# Patient Record
Sex: MALE | Race: WHITE | NOT HISPANIC OR LATINO | Employment: FULL TIME | URBAN - NONMETROPOLITAN AREA
[De-identification: names, ages, dates, MRNs, and addresses within clinical notes are randomized per-mention and may not be internally consistent; named-entity substitution may affect disease eponyms.]

---

## 2024-10-02 ENCOUNTER — OFFICE VISIT (OUTPATIENT)
Dept: FAMILY MEDICINE CLINIC | Facility: CLINIC | Age: 29
End: 2024-10-02

## 2024-10-02 VITALS
BODY MASS INDEX: 41.75 KG/M2 | WEIGHT: 291.6 LBS | DIASTOLIC BLOOD PRESSURE: 80 MMHG | HEIGHT: 70 IN | SYSTOLIC BLOOD PRESSURE: 128 MMHG | TEMPERATURE: 98 F | OXYGEN SATURATION: 97 % | HEART RATE: 71 BPM

## 2024-10-02 DIAGNOSIS — J01.11 ACUTE RECURRENT FRONTAL SINUSITIS: ICD-10-CM

## 2024-10-02 DIAGNOSIS — H65.191 OTHER ACUTE NONSUPPURATIVE OTITIS MEDIA OF RIGHT EAR, RECURRENCE NOT SPECIFIED: Primary | ICD-10-CM

## 2024-10-02 RX ORDER — CEFDINIR 300 MG/1
300 CAPSULE ORAL 2 TIMES DAILY
Qty: 20 CAPSULE | Refills: 0 | Status: SHIPPED | OUTPATIENT
Start: 2024-10-02 | End: 2024-10-12

## 2024-10-02 RX ORDER — LORATADINE/PSEUDOEPHEDRINE 5 MG-120MG
TABLET, EXTENDED RELEASE 12 HR ORAL
Qty: 20 TABLET | Refills: 0 | Status: SHIPPED | OUTPATIENT
Start: 2024-10-02

## 2024-10-02 RX ORDER — DEXTROMETHORPHAN HYDROBROMIDE AND PROMETHAZINE HYDROCHLORIDE 15; 6.25 MG/5ML; MG/5ML
SYRUP ORAL
Qty: 118 ML | Refills: 0 | Status: SHIPPED | OUTPATIENT
Start: 2024-10-02

## 2024-10-02 RX ORDER — CETIRIZINE HYDROCHLORIDE 10 MG/1
10 TABLET ORAL DAILY
COMMUNITY
End: 2024-10-02

## 2024-10-02 RX ORDER — FLUTICASONE PROPIONATE 50 UG/1
SPRAY, METERED NASAL
Qty: 15.8 ML | Refills: 0 | Status: SHIPPED | OUTPATIENT
Start: 2024-10-02

## 2024-10-02 RX ORDER — GUAIFENESIN 600 MG/1
600 TABLET, EXTENDED RELEASE ORAL 2 TIMES DAILY
Qty: 10 TABLET | Refills: 0 | Status: SHIPPED | OUTPATIENT
Start: 2024-10-02 | End: 2024-10-07

## 2024-10-02 NOTE — PROGRESS NOTES
[facility]     Chief Complaint  Establish Care (/), Allergies, and Nasal Congestion    Yariel Lerma is a 29 y.o. male who presents today to [facility] for Establish Care (/), Allergies, and Nasal Congestion.    HPI:   HPI   Patient reports that he has been having and allergy and sinus flareup for approximately 3 weeks.  He did take a round of Augmentin but reports that he did not notice much difference with that.  He also took a steroid pack but after he completed the steroid pack the symptoms seem to come right back.  He reports having nasal stuffiness and drainage that feels like it is going to choke him.  States he feels like that it is moving down into his chest and he has been having a sore throat.  Nighttime the drainage does get worse and he has a mild cough.  Reports he feels full in his sinuses and ears, and his right ear feels itchy.    Previous History:   Past Medical History:   Diagnosis Date    Allergic       History reviewed. No pertinent surgical history.   Social History     Socioeconomic History    Marital status: Single   Tobacco Use    Smoking status: Never    Smokeless tobacco: Never   Vaping Use    Vaping status: Never Used   Substance and Sexual Activity    Alcohol use: Never    Drug use: Never      Health Maintenance Due   Topic Date Due    BMI FOLLOWUP  Never done    TDAP/TD VACCINES (1 - Tdap) Never done    COVID-19 Vaccine (1 - 2023-24 season) Never done    HEPATITIS C SCREENING  Never done    ANNUAL PHYSICAL  Never done        Current Medications:  Current Outpatient Medications   Medication Sig Dispense Refill    cefdinir (OMNICEF) 300 MG capsule Take 1 capsule by mouth 2 (Two) Times a Day for 10 days. 20 capsule 0    fluticasone (FLONASE) 50 MCG/ACT nasal spray 2 sprays in each nostril x 1 week, then reduce to 1 spray in each nostril daily for remainder of month 15.8 mL 0    guaiFENesin (Mucinex) 600 MG 12 hr tablet Take 1 tablet by mouth 2  "(Two) Times a Day for 5 days. 10 tablet 0    loratadine-pseudoephedrine (Alavert D-12 Hour Allergy/Clay) 5-120 MG per 12 hr tablet One tablet po twice per day x 10 days, then may resume regular allergy medication (loratadine) 20 tablet 0    promethazine-dextromethorphan (PROMETHAZINE-DM) 6.25-15 MG/5ML syrup 5 ML PO @ BEDTIME PRN COUGH DISTURBING SLEEP 118 mL 0     No current facility-administered medications for this visit.       Allergies:   No Known Allergies    Vitals:   /80 (BP Location: Right arm, Patient Position: Sitting)   Pulse 71   Temp 98 °F (36.7 °C) (Temporal)   Ht 177.8 cm (70\")   Wt 132 kg (291 lb 9.6 oz)   SpO2 97%   BMI 41.84 kg/m²   Estimated body mass index is 41.84 kg/m² as calculated from the following:    Height as of this encounter: 177.8 cm (70\").    Weight as of this encounter: 132 kg (291 lb 9.6 oz).               Physical Exam:   Physical Exam  Vitals reviewed.   Constitutional:       Appearance: Normal appearance.   HENT:      Head: Normocephalic.      Right Ear: Ear canal normal. Tympanic membrane is erythematous and bulging.      Left Ear: Ear canal normal. Tympanic membrane is retracted.      Nose: Nasal tenderness, mucosal edema and congestion present.      Right Sinus: Frontal sinus tenderness present.      Left Sinus: Frontal sinus tenderness present.      Mouth/Throat:      Pharynx: Posterior oropharyngeal erythema present.   Eyes:      Extraocular Movements: Extraocular movements intact.      Conjunctiva/sclera: Conjunctivae normal.   Cardiovascular:      Rate and Rhythm: Normal rate.      Heart sounds: Normal heart sounds.   Pulmonary:      Effort: Pulmonary effort is normal.      Breath sounds: Decreased breath sounds present.   Abdominal:      General: Bowel sounds are normal.      Palpations: Abdomen is soft.   Lymphadenopathy:      Cervical: Cervical adenopathy present.   Skin:     General: Skin is warm and dry.   Neurological:      Mental Status: He is alert. " Mental status is at baseline.      Comments: Normal gait    Psychiatric:         Mood and Affect: Mood normal.          Lab Results:   No visits with results within 3 Month(s) from this visit.   Latest known visit with results is:   No results found for any previous visit.       Results review: During today's encounter, all relevant clinical data has been reviewed.      Assessment and Plan  Diagnoses and all orders for this visit:    1. Other acute nonsuppurative otitis media of right ear, recurrence not specified (Primary)  -     cefdinir (OMNICEF) 300 MG capsule; Take 1 capsule by mouth 2 (Two) Times a Day for 10 days.  Dispense: 20 capsule; Refill: 0    2. Acute recurrent frontal sinusitis  -     loratadine-pseudoephedrine (Alavert D-12 Hour Allergy/Clay) 5-120 MG per 12 hr tablet; One tablet po twice per day x 10 days, then may resume regular allergy medication (loratadine)  Dispense: 20 tablet; Refill: 0  -     fluticasone (FLONASE) 50 MCG/ACT nasal spray; 2 sprays in each nostril x 1 week, then reduce to 1 spray in each nostril daily for remainder of month  Dispense: 15.8 mL; Refill: 0  -     guaiFENesin (Mucinex) 600 MG 12 hr tablet; Take 1 tablet by mouth 2 (Two) Times a Day for 5 days.  Dispense: 10 tablet; Refill: 0  -     promethazine-dextromethorphan (PROMETHAZINE-DM) 6.25-15 MG/5ML syrup; 5 ML PO @ BEDTIME PRN COUGH DISTURBING SLEEP  Dispense: 118 mL; Refill: 0    1-2) patient is self-pay, so he is going to notify us if medications are expensive.  He was swabbed for viral illnesses at urgent care and those were negative.  He reports he is going to try to get signed up for insurance and he would like to be tested for asthma as he feels like after he has allergy flareup he has symptoms that are similar to this.  There is no shortness of breath.  We discussed asthma testing and he is going to follow-up when able.  If no improvement in the next 2 to 3 days with antibiotic change, he is going to notify us we  will  send in steroid therapy if appropriate.    Class 3 Severe Obesity (BMI >=40). Obesity-related health conditions include the following: obstructive sleep apnea, hypertension, coronary heart disease, diabetes mellitus, and impaired fasting glucose. Obesity is unchanged. BMI is is above average; BMI management plan is completed. We discussed portion control and increasing exercise.       New Medications:   New Medications Ordered This Visit   Medications    loratadine-pseudoephedrine (Alavert D-12 Hour Allergy/Clay) 5-120 MG per 12 hr tablet     Sig: One tablet po twice per day x 10 days, then may resume regular allergy medication (loratadine)     Dispense:  20 tablet     Refill:  0    fluticasone (FLONASE) 50 MCG/ACT nasal spray     Si sprays in each nostril x 1 week, then reduce to 1 spray in each nostril daily for remainder of month     Dispense:  15.8 mL     Refill:  0    guaiFENesin (Mucinex) 600 MG 12 hr tablet     Sig: Take 1 tablet by mouth 2 (Two) Times a Day for 5 days.     Dispense:  10 tablet     Refill:  0    promethazine-dextromethorphan (PROMETHAZINE-DM) 6.25-15 MG/5ML syrup     Si ML PO @ BEDTIME PRN COUGH DISTURBING SLEEP     Dispense:  118 mL     Refill:  0    cefdinir (OMNICEF) 300 MG capsule     Sig: Take 1 capsule by mouth 2 (Two) Times a Day for 10 days.     Dispense:  20 capsule     Refill:  0       Discontinued Medications:   Medications Discontinued During This Encounter   Medication Reason    amoxicillin (AMOXIL) 875 MG tablet *Therapy completed    benzonatate (TESSALON) 200 MG capsule *Therapy completed    cetirizine (zyrTEC) 10 MG tablet               Visit Diagnoses:    ICD-10-CM ICD-9-CM   1. Other acute nonsuppurative otitis media of right ear, recurrence not specified  H65.191 381.00   2. Acute recurrent frontal sinusitis  J01.11 461.1            Follow Up:   Return if symptoms worsen or fail to improve.    Patient was given instructions and counseling regarding his  condition or for health maintenance advice. Please see specific information pulled into the AVS if appropriate.       This document has been electronically signed by LOPEZ Cabrera   October 2, 2024 11:06 EDT    Dictated Utilizing Dragon Dictation: Part of this note may be an electronic transcription/translation of spoken language to printed text using the Dragon Dictation System.